# Patient Record
Sex: FEMALE | Race: WHITE | NOT HISPANIC OR LATINO | Employment: OTHER | URBAN - METROPOLITAN AREA
[De-identification: names, ages, dates, MRNs, and addresses within clinical notes are randomized per-mention and may not be internally consistent; named-entity substitution may affect disease eponyms.]

---

## 2020-11-14 PROBLEM — M32.9 LUPUS: Status: ACTIVE | Noted: 2020-11-14

## 2021-04-09 ENCOUNTER — TELEPHONE (OUTPATIENT)
Dept: FAMILY MEDICINE | Facility: CLINIC | Age: 66
End: 2021-04-09

## 2022-06-13 ENCOUNTER — PES CALL (OUTPATIENT)
Dept: ADMINISTRATIVE | Facility: CLINIC | Age: 67
End: 2022-06-13
Payer: MEDICARE

## 2022-07-28 ENCOUNTER — TELEPHONE (OUTPATIENT)
Dept: FAMILY MEDICINE | Facility: CLINIC | Age: 67
End: 2022-07-28
Payer: MEDICARE

## 2022-11-21 ENCOUNTER — OFFICE VISIT (OUTPATIENT)
Dept: OPHTHALMOLOGY | Facility: CLINIC | Age: 67
End: 2022-11-21
Payer: MEDICARE

## 2022-11-21 DIAGNOSIS — Z94.7 H/O CORNEA TRANSPLANT: ICD-10-CM

## 2022-11-21 DIAGNOSIS — Z96.1 PSEUDOPHAKIA, BOTH EYES: ICD-10-CM

## 2022-11-21 DIAGNOSIS — H26.491 POSTERIOR CAPSULAR OPACIFICATION, RIGHT EYE: Primary | ICD-10-CM

## 2022-11-21 DIAGNOSIS — H50.00 ESOTROPIA: ICD-10-CM

## 2022-11-21 PROCEDURE — 3288F FALL RISK ASSESSMENT DOCD: CPT | Mod: CPTII,S$GLB,, | Performed by: OPHTHALMOLOGY

## 2022-11-21 PROCEDURE — 99204 OFFICE O/P NEW MOD 45 MIN: CPT | Mod: S$GLB,,, | Performed by: OPHTHALMOLOGY

## 2022-11-21 PROCEDURE — 1160F PR REVIEW ALL MEDS BY PRESCRIBER/CLIN PHARMACIST DOCUMENTED: ICD-10-PCS | Mod: CPTII,S$GLB,, | Performed by: OPHTHALMOLOGY

## 2022-11-21 PROCEDURE — 1101F PR PT FALLS ASSESS DOC 0-1 FALLS W/OUT INJ PAST YR: ICD-10-PCS | Mod: CPTII,S$GLB,, | Performed by: OPHTHALMOLOGY

## 2022-11-21 PROCEDURE — 1159F PR MEDICATION LIST DOCUMENTED IN MEDICAL RECORD: ICD-10-PCS | Mod: CPTII,S$GLB,, | Performed by: OPHTHALMOLOGY

## 2022-11-21 PROCEDURE — 1101F PT FALLS ASSESS-DOCD LE1/YR: CPT | Mod: CPTII,S$GLB,, | Performed by: OPHTHALMOLOGY

## 2022-11-21 PROCEDURE — 3288F PR FALLS RISK ASSESSMENT DOCUMENTED: ICD-10-PCS | Mod: CPTII,S$GLB,, | Performed by: OPHTHALMOLOGY

## 2022-11-21 PROCEDURE — 99999 PR PBB SHADOW E&M-EST. PATIENT-LVL II: ICD-10-PCS | Mod: PBBFAC,,, | Performed by: OPHTHALMOLOGY

## 2022-11-21 PROCEDURE — 1126F AMNT PAIN NOTED NONE PRSNT: CPT | Mod: CPTII,S$GLB,, | Performed by: OPHTHALMOLOGY

## 2022-11-21 PROCEDURE — 1160F RVW MEDS BY RX/DR IN RCRD: CPT | Mod: CPTII,S$GLB,, | Performed by: OPHTHALMOLOGY

## 2022-11-21 PROCEDURE — 1159F MED LIST DOCD IN RCRD: CPT | Mod: CPTII,S$GLB,, | Performed by: OPHTHALMOLOGY

## 2022-11-21 PROCEDURE — 99999 PR PBB SHADOW E&M-EST. PATIENT-LVL II: CPT | Mod: PBBFAC,,, | Performed by: OPHTHALMOLOGY

## 2022-11-21 PROCEDURE — 1126F PR PAIN SEVERITY QUANTIFIED, NO PAIN PRESENT: ICD-10-PCS | Mod: CPTII,S$GLB,, | Performed by: OPHTHALMOLOGY

## 2022-11-21 PROCEDURE — 99204 PR OFFICE/OUTPT VISIT, NEW, LEVL IV, 45-59 MIN: ICD-10-PCS | Mod: S$GLB,,, | Performed by: OPHTHALMOLOGY

## 2022-11-21 NOTE — PROGRESS NOTES
HPI     Blurred Vision     Additional comments: Here to establish care. Denies eye pain today, pt   c/o double vision states images move in different lighting situations can   occur when driving. States if she covers her left eye images on the right   eye move over they are not straight. Has had two corneal surgeries on   Right eye  . Does not use any eye gtts currently. Pt no longer drives due   to vision.   POH:  K Transplant OD 2003  K Transplant OS 2007  CE IOL OU Done 2007          Last edited by Jenna Be on 11/21/2022  3:28 PM.            Assessment /Plan     For exam results, see Encounter Report.    Posterior capsular opacification, right eye    Pseudophakia, both eyes    H/O cornea transplant    Esotropia    1. Posterior capsular opacification, right eye  Significant, recommend YAG capsultomy RBA discussed, pt agreeable    Schedule yag cap OD    2. Pseudophakia, both eyes  See above, clear OS    3. H/O cornea transplant  PKP OD, DSEK OS  Very high astigmatism  Consider glasses or CL eval after yag cap    4. 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

## 2022-12-20 ENCOUNTER — OFFICE VISIT (OUTPATIENT)
Dept: OPHTHALMOLOGY | Facility: CLINIC | Age: 67
End: 2022-12-20
Payer: MEDICARE

## 2022-12-20 DIAGNOSIS — H26.491 POSTERIOR CAPSULAR OPACIFICATION, RIGHT EYE: Primary | ICD-10-CM

## 2022-12-20 PROCEDURE — 1159F MED LIST DOCD IN RCRD: CPT | Mod: CPTII,S$GLB,, | Performed by: OPHTHALMOLOGY

## 2022-12-20 PROCEDURE — 1159F PR MEDICATION LIST DOCUMENTED IN MEDICAL RECORD: ICD-10-PCS | Mod: CPTII,S$GLB,, | Performed by: OPHTHALMOLOGY

## 2022-12-20 PROCEDURE — 1101F PR PT FALLS ASSESS DOC 0-1 FALLS W/OUT INJ PAST YR: ICD-10-PCS | Mod: CPTII,S$GLB,, | Performed by: OPHTHALMOLOGY

## 2022-12-20 PROCEDURE — 1101F PT FALLS ASSESS-DOCD LE1/YR: CPT | Mod: CPTII,S$GLB,, | Performed by: OPHTHALMOLOGY

## 2022-12-20 PROCEDURE — 99999 PR PBB SHADOW E&M-EST. PATIENT-LVL II: CPT | Mod: PBBFAC,,, | Performed by: OPHTHALMOLOGY

## 2022-12-20 PROCEDURE — 1160F RVW MEDS BY RX/DR IN RCRD: CPT | Mod: CPTII,S$GLB,, | Performed by: OPHTHALMOLOGY

## 2022-12-20 PROCEDURE — 1126F AMNT PAIN NOTED NONE PRSNT: CPT | Mod: CPTII,S$GLB,, | Performed by: OPHTHALMOLOGY

## 2022-12-20 PROCEDURE — 66821 YAG CAPSULOTOMY - OD - RIGHT EYE: ICD-10-PCS | Mod: RT,S$GLB,, | Performed by: OPHTHALMOLOGY

## 2022-12-20 PROCEDURE — 1126F PR PAIN SEVERITY QUANTIFIED, NO PAIN PRESENT: ICD-10-PCS | Mod: CPTII,S$GLB,, | Performed by: OPHTHALMOLOGY

## 2022-12-20 PROCEDURE — 66821 AFTER CATARACT LASER SURGERY: CPT | Mod: RT,S$GLB,, | Performed by: OPHTHALMOLOGY

## 2022-12-20 PROCEDURE — 3288F PR FALLS RISK ASSESSMENT DOCUMENTED: ICD-10-PCS | Mod: CPTII,S$GLB,, | Performed by: OPHTHALMOLOGY

## 2022-12-20 PROCEDURE — 1160F PR REVIEW ALL MEDS BY PRESCRIBER/CLIN PHARMACIST DOCUMENTED: ICD-10-PCS | Mod: CPTII,S$GLB,, | Performed by: OPHTHALMOLOGY

## 2022-12-20 PROCEDURE — 3288F FALL RISK ASSESSMENT DOCD: CPT | Mod: CPTII,S$GLB,, | Performed by: OPHTHALMOLOGY

## 2022-12-20 PROCEDURE — 99499 NO LOS: ICD-10-PCS | Mod: S$GLB,,, | Performed by: OPHTHALMOLOGY

## 2022-12-20 PROCEDURE — 99499 UNLISTED E&M SERVICE: CPT | Mod: S$GLB,,, | Performed by: OPHTHALMOLOGY

## 2022-12-20 PROCEDURE — 99999 PR PBB SHADOW E&M-EST. PATIENT-LVL II: ICD-10-PCS | Mod: PBBFAC,,, | Performed by: OPHTHALMOLOGY

## 2022-12-20 NOTE — PROGRESS NOTES
HPI    YAG cap OD     Pt states vision OD is very cloudy. Can not see much     Denies pain.       +DV   Last edited by Dai Selby on 12/20/2022  3:00 PM.            Assessment /Plan     For exam results, see Encounter Report.    Posterior capsular opacification, right eye      YAG Cap OD today  No complications    Use Ats  F/u with me in 4-8 weeks for DFE OD    Recommend Dr. Marrero for prism eval next available

## 2022-12-21 ENCOUNTER — TELEPHONE (OUTPATIENT)
Dept: OPTOMETRY | Facility: CLINIC | Age: 67
End: 2022-12-21
Payer: MEDICARE

## 2022-12-21 NOTE — TELEPHONE ENCOUNTER
----- Message from Peri Ballard sent at 12/20/2022  4:53 PM CST -----  Madisyn LITTLE Staff  Caller: Unspecified (Today,  3:57 PM)  Please reach out to patient to scheduled Prism/glasses eval, per Dr Farnsworth, next available.         Thanks   Ella

## 2023-02-09 ENCOUNTER — OFFICE VISIT (OUTPATIENT)
Dept: OPTOMETRY | Facility: CLINIC | Age: 68
End: 2023-02-09
Payer: MEDICARE

## 2023-02-09 DIAGNOSIS — H52.7 REFRACTIVE ERROR: ICD-10-CM

## 2023-02-09 DIAGNOSIS — H53.2 DIPLOPIA: Primary | ICD-10-CM

## 2023-02-09 DIAGNOSIS — H50.011 ESOTROPIA, RIGHT EYE: ICD-10-CM

## 2023-02-09 PROCEDURE — 1126F PR PAIN SEVERITY QUANTIFIED, NO PAIN PRESENT: ICD-10-PCS | Mod: CPTII,S$GLB,, | Performed by: OPTOMETRIST

## 2023-02-09 PROCEDURE — 3288F FALL RISK ASSESSMENT DOCD: CPT | Mod: CPTII,S$GLB,, | Performed by: OPTOMETRIST

## 2023-02-09 PROCEDURE — 1101F PR PT FALLS ASSESS DOC 0-1 FALLS W/OUT INJ PAST YR: ICD-10-PCS | Mod: CPTII,S$GLB,, | Performed by: OPTOMETRIST

## 2023-02-09 PROCEDURE — 1160F RVW MEDS BY RX/DR IN RCRD: CPT | Mod: CPTII,S$GLB,, | Performed by: OPTOMETRIST

## 2023-02-09 PROCEDURE — 92002 INTRM OPH EXAM NEW PATIENT: CPT | Mod: S$GLB,,, | Performed by: OPTOMETRIST

## 2023-02-09 PROCEDURE — 1159F MED LIST DOCD IN RCRD: CPT | Mod: CPTII,S$GLB,, | Performed by: OPTOMETRIST

## 2023-02-09 PROCEDURE — 3288F PR FALLS RISK ASSESSMENT DOCUMENTED: ICD-10-PCS | Mod: CPTII,S$GLB,, | Performed by: OPTOMETRIST

## 2023-02-09 PROCEDURE — 99999 PR PBB SHADOW E&M-EST. PATIENT-LVL II: ICD-10-PCS | Mod: PBBFAC,,, | Performed by: OPTOMETRIST

## 2023-02-09 PROCEDURE — 1160F PR REVIEW ALL MEDS BY PRESCRIBER/CLIN PHARMACIST DOCUMENTED: ICD-10-PCS | Mod: CPTII,S$GLB,, | Performed by: OPTOMETRIST

## 2023-02-09 PROCEDURE — 1101F PT FALLS ASSESS-DOCD LE1/YR: CPT | Mod: CPTII,S$GLB,, | Performed by: OPTOMETRIST

## 2023-02-09 PROCEDURE — 1159F PR MEDICATION LIST DOCUMENTED IN MEDICAL RECORD: ICD-10-PCS | Mod: CPTII,S$GLB,, | Performed by: OPTOMETRIST

## 2023-02-09 PROCEDURE — 1126F AMNT PAIN NOTED NONE PRSNT: CPT | Mod: CPTII,S$GLB,, | Performed by: OPTOMETRIST

## 2023-02-09 PROCEDURE — 92002 PR EYE EXAM, NEW PATIENT,INTERMED: ICD-10-PCS | Mod: S$GLB,,, | Performed by: OPTOMETRIST

## 2023-02-09 PROCEDURE — 99999 PR PBB SHADOW E&M-EST. PATIENT-LVL II: CPT | Mod: PBBFAC,,, | Performed by: OPTOMETRIST

## 2023-02-09 NOTE — PROGRESS NOTES
HPI    Pt here for mrx with prism per Dr. Farnsworth dls- 12/20/22    Pt states she has problems with focusing, has diplopia states it seems to   go in and out.   Pt has had prism in the past. Very occasional using eye patch OD.   Last edited by Monserrat Trejo on 2/9/2023  1:18 PM.            Assessment /Plan     For exam results, see Encounter Report.    Diplopia    Esotropia, right eye    Refractive error      1,2. Unable to get single with less than 20 prism diopters, glasses with high cyl and minus would be very thick, recommend eval for surgical correction.   3. Hold off on spec rx for now

## 2023-02-14 ENCOUNTER — OFFICE VISIT (OUTPATIENT)
Dept: OPHTHALMOLOGY | Facility: CLINIC | Age: 68
End: 2023-02-14
Payer: MEDICARE

## 2023-02-14 DIAGNOSIS — Z98.890 S/P YAG CAPSULOTOMY, RIGHT: Primary | ICD-10-CM

## 2023-02-14 DIAGNOSIS — H50.00 ESOTROPIA: ICD-10-CM

## 2023-02-14 PROCEDURE — 1126F PR PAIN SEVERITY QUANTIFIED, NO PAIN PRESENT: ICD-10-PCS | Mod: CPTII,S$GLB,, | Performed by: OPHTHALMOLOGY

## 2023-02-14 PROCEDURE — 99999 PR PBB SHADOW E&M-EST. PATIENT-LVL II: CPT | Mod: PBBFAC,,, | Performed by: OPHTHALMOLOGY

## 2023-02-14 PROCEDURE — 1159F MED LIST DOCD IN RCRD: CPT | Mod: CPTII,S$GLB,, | Performed by: OPHTHALMOLOGY

## 2023-02-14 PROCEDURE — 99999 PR PBB SHADOW E&M-EST. PATIENT-LVL II: ICD-10-PCS | Mod: PBBFAC,,, | Performed by: OPHTHALMOLOGY

## 2023-02-14 PROCEDURE — 99024 PR POST-OP FOLLOW-UP VISIT: ICD-10-PCS | Mod: S$GLB,,, | Performed by: OPHTHALMOLOGY

## 2023-02-14 PROCEDURE — 1126F AMNT PAIN NOTED NONE PRSNT: CPT | Mod: CPTII,S$GLB,, | Performed by: OPHTHALMOLOGY

## 2023-02-14 PROCEDURE — 1159F PR MEDICATION LIST DOCUMENTED IN MEDICAL RECORD: ICD-10-PCS | Mod: CPTII,S$GLB,, | Performed by: OPHTHALMOLOGY

## 2023-02-14 PROCEDURE — 3288F PR FALLS RISK ASSESSMENT DOCUMENTED: ICD-10-PCS | Mod: CPTII,S$GLB,, | Performed by: OPHTHALMOLOGY

## 2023-02-14 PROCEDURE — 1160F PR REVIEW ALL MEDS BY PRESCRIBER/CLIN PHARMACIST DOCUMENTED: ICD-10-PCS | Mod: CPTII,S$GLB,, | Performed by: OPHTHALMOLOGY

## 2023-02-14 PROCEDURE — 3288F FALL RISK ASSESSMENT DOCD: CPT | Mod: CPTII,S$GLB,, | Performed by: OPHTHALMOLOGY

## 2023-02-14 PROCEDURE — 99024 POSTOP FOLLOW-UP VISIT: CPT | Mod: S$GLB,,, | Performed by: OPHTHALMOLOGY

## 2023-02-14 PROCEDURE — 1101F PR PT FALLS ASSESS DOC 0-1 FALLS W/OUT INJ PAST YR: ICD-10-PCS | Mod: CPTII,S$GLB,, | Performed by: OPHTHALMOLOGY

## 2023-02-14 PROCEDURE — 1160F RVW MEDS BY RX/DR IN RCRD: CPT | Mod: CPTII,S$GLB,, | Performed by: OPHTHALMOLOGY

## 2023-02-14 PROCEDURE — 1101F PT FALLS ASSESS-DOCD LE1/YR: CPT | Mod: CPTII,S$GLB,, | Performed by: OPHTHALMOLOGY

## 2023-02-14 NOTE — PROGRESS NOTES
HPI    Pt presents for 6 week DFE OD     Pt states she can see more peripherally OD now.     Systane PRN OU          Last edited by Madisyn Uribe on 2/14/2023 10:22 AM.            Assessment /Plan     For exam results, see Encounter Report.    S/P YAG capsulotomy, right    Esotropia      1. S/P YAG capsulotomy, right  Successful outcome on exam  [facility] the same, but pt reports improved peripheral vision    2. 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 Marrero could not get prisms to work and recommended strab jabari  Will refer to strab sx

## 2023-02-15 ENCOUNTER — TELEPHONE (OUTPATIENT)
Dept: OPHTHALMOLOGY | Facility: CLINIC | Age: 68
End: 2023-02-15
Payer: MEDICARE

## 2023-02-15 NOTE — TELEPHONE ENCOUNTER
Lvm for patient to get scheduled.    -   ----- Message from Hermila Mitchell sent at 2/14/2023  4:48 PM CST -----    ----- Message -----  From: Madisyn Uribe  Sent: 2/14/2023  11:19 AM CST  To: Duran SANTA Staff    Good morning,     Can you please reach out to the patient for a strab eval with Dr Garzon after approval?    Thanks,  Ella        Valtrex Pregnancy And Lactation Text: this medication is Pregnancy Category B and is considered safe during pregnancy. This medication is not directly found in breast milk but it's metabolite acyclovir is present.

## 2023-04-26 ENCOUNTER — OFFICE VISIT (OUTPATIENT)
Dept: OPHTHALMOLOGY | Facility: CLINIC | Age: 68
End: 2023-04-26
Payer: MEDICARE

## 2023-04-26 DIAGNOSIS — R11.0 NAUSEA: ICD-10-CM

## 2023-04-26 DIAGNOSIS — H53.2 DIPLOPIA: ICD-10-CM

## 2023-04-26 DIAGNOSIS — H50.00 ESOTROPIA: Primary | ICD-10-CM

## 2023-04-26 PROCEDURE — 99213 OFFICE O/P EST LOW 20 MIN: CPT | Mod: S$GLB,,, | Performed by: STUDENT IN AN ORGANIZED HEALTH CARE EDUCATION/TRAINING PROGRAM

## 2023-04-26 PROCEDURE — 92060 SENSORIMOTOR EXAMINATION: CPT | Mod: S$GLB,,, | Performed by: STUDENT IN AN ORGANIZED HEALTH CARE EDUCATION/TRAINING PROGRAM

## 2023-04-26 PROCEDURE — 1159F PR MEDICATION LIST DOCUMENTED IN MEDICAL RECORD: ICD-10-PCS | Mod: CPTII,S$GLB,, | Performed by: STUDENT IN AN ORGANIZED HEALTH CARE EDUCATION/TRAINING PROGRAM

## 2023-04-26 PROCEDURE — 99213 PR OFFICE/OUTPT VISIT, EST, LEVL III, 20-29 MIN: ICD-10-PCS | Mod: S$GLB,,, | Performed by: STUDENT IN AN ORGANIZED HEALTH CARE EDUCATION/TRAINING PROGRAM

## 2023-04-26 PROCEDURE — 99999 PR PBB SHADOW E&M-EST. PATIENT-LVL I: CPT | Mod: PBBFAC,,, | Performed by: STUDENT IN AN ORGANIZED HEALTH CARE EDUCATION/TRAINING PROGRAM

## 2023-04-26 PROCEDURE — 99999 PR PBB SHADOW E&M-EST. PATIENT-LVL I: ICD-10-PCS | Mod: PBBFAC,,, | Performed by: STUDENT IN AN ORGANIZED HEALTH CARE EDUCATION/TRAINING PROGRAM

## 2023-04-26 PROCEDURE — 1159F MED LIST DOCD IN RCRD: CPT | Mod: CPTII,S$GLB,, | Performed by: STUDENT IN AN ORGANIZED HEALTH CARE EDUCATION/TRAINING PROGRAM

## 2023-04-26 PROCEDURE — 92060 PR SPECIAL EYE EVAL,SENSORIMOTOR: ICD-10-PCS | Mod: S$GLB,,, | Performed by: STUDENT IN AN ORGANIZED HEALTH CARE EDUCATION/TRAINING PROGRAM

## 2023-04-26 NOTE — PROGRESS NOTES
Assessment /Plan     For exam results, see Encounter Report.    Esotropia    Diplopia    Nausea      Discussed findings with patient today.    Deviation measures 20-25 however she fuses with much less   Mainly commitant deviation present for several years.   Discussed option of prism glasses, vs patching vs surgery   She would like to try prism glasses as is able to fuse with only 10PD today   Fresnel rx given. Discussed trying for a couple fo weeks and calling if working for ground in prism   If break through bothersome double can consider strab surgery.     RTC pending trial with fresnel prism     This service was scribed by Hermila Mitchell for and in the presence of Dr. Garzon who personally performed this service.    Hermila Mitchell, technician     Valerie Garzon MD

## 2023-05-11 ENCOUNTER — TELEPHONE (OUTPATIENT)
Dept: OPHTHALMOLOGY | Facility: CLINIC | Age: 68
End: 2023-05-11
Payer: MEDICARE

## 2023-05-11 NOTE — TELEPHONE ENCOUNTER
Dariel for pt to call.    -   ----- Message from Martín Deluca sent at 5/11/2023 12:04 PM CDT -----  Contact: [phone]  Pt is calling and was told to speak with someone about how she has been feeling after two weeks. . Please call back to further assist

## 2023-06-07 ENCOUNTER — TELEPHONE (OUTPATIENT)
Dept: OPHTHALMOLOGY | Facility: CLINIC | Age: 68
End: 2023-06-07
Payer: MEDICARE

## 2023-06-07 NOTE — TELEPHONE ENCOUNTER
[facility] for pt to call back         ----- Message from Khurram Cavazos sent at 6/7/2023 11:29 AM CDT -----  Contact: pt at  [phone]  Type: Needs Medical Advice  Who Called:  pt  Best Call Back Number: [phone]  Additional Information: pt is calling the office requesting a call back from the nurse.

## 2023-06-08 ENCOUNTER — TELEPHONE (OUTPATIENT)
Dept: OPHTHALMOLOGY | Facility: CLINIC | Age: 68
End: 2023-06-08
Payer: MEDICARE

## 2023-06-08 NOTE — TELEPHONE ENCOUNTER
Narinder for pt to call back     ----- Message from Carolyn Willoughby sent at 6/8/2023  9:20 AM CDT -----  Type:  Patient Returning Call    Who Called:  pt   Who Left Message for Patient:  christian    Does the patient know what this is regarding?:  yes   Best Call Back Number:  [phone]    Additional Information:  please advise

## 2023-06-09 ENCOUNTER — LAB VISIT (OUTPATIENT)
Dept: LAB | Facility: HOSPITAL | Age: 68
End: 2023-06-09
Attending: STUDENT IN AN ORGANIZED HEALTH CARE EDUCATION/TRAINING PROGRAM
Payer: MEDICARE

## 2023-06-09 ENCOUNTER — OFFICE VISIT (OUTPATIENT)
Dept: OPHTHALMOLOGY | Facility: CLINIC | Age: 68
End: 2023-06-09
Payer: MEDICARE

## 2023-06-09 DIAGNOSIS — H53.2 DIPLOPIA: ICD-10-CM

## 2023-06-09 DIAGNOSIS — H53.30 DISORDER OF BINOCULAR VISION: ICD-10-CM

## 2023-06-09 DIAGNOSIS — H50.00 ESOTROPIA: Primary | ICD-10-CM

## 2023-06-09 DIAGNOSIS — H51.9 ABNORMAL EYE MOVEMENTS: ICD-10-CM

## 2023-06-09 LAB
CREAT SERPL-MCNC: 0.8 MG/DL (ref 0.5–1.4)
EST. GFR  (NO RACE VARIABLE): >60 ML/MIN/1.73 M^2

## 2023-06-09 PROCEDURE — 99214 OFFICE O/P EST MOD 30 MIN: CPT | Mod: S$GLB,,, | Performed by: STUDENT IN AN ORGANIZED HEALTH CARE EDUCATION/TRAINING PROGRAM

## 2023-06-09 PROCEDURE — 1101F PR PT FALLS ASSESS DOC 0-1 FALLS W/OUT INJ PAST YR: ICD-10-PCS | Mod: CPTII,S$GLB,, | Performed by: STUDENT IN AN ORGANIZED HEALTH CARE EDUCATION/TRAINING PROGRAM

## 2023-06-09 PROCEDURE — 1126F PR PAIN SEVERITY QUANTIFIED, NO PAIN PRESENT: ICD-10-PCS | Mod: CPTII,S$GLB,, | Performed by: STUDENT IN AN ORGANIZED HEALTH CARE EDUCATION/TRAINING PROGRAM

## 2023-06-09 PROCEDURE — 92060 PR SPECIAL EYE EVAL,SENSORIMOTOR: ICD-10-PCS | Mod: S$GLB,,, | Performed by: STUDENT IN AN ORGANIZED HEALTH CARE EDUCATION/TRAINING PROGRAM

## 2023-06-09 PROCEDURE — 99214 PR OFFICE/OUTPT VISIT, EST, LEVL IV, 30-39 MIN: ICD-10-PCS | Mod: S$GLB,,, | Performed by: STUDENT IN AN ORGANIZED HEALTH CARE EDUCATION/TRAINING PROGRAM

## 2023-06-09 PROCEDURE — 36415 COLL VENOUS BLD VENIPUNCTURE: CPT | Performed by: STUDENT IN AN ORGANIZED HEALTH CARE EDUCATION/TRAINING PROGRAM

## 2023-06-09 PROCEDURE — 82565 ASSAY OF CREATININE: CPT | Performed by: STUDENT IN AN ORGANIZED HEALTH CARE EDUCATION/TRAINING PROGRAM

## 2023-06-09 PROCEDURE — 1126F AMNT PAIN NOTED NONE PRSNT: CPT | Mod: CPTII,S$GLB,, | Performed by: STUDENT IN AN ORGANIZED HEALTH CARE EDUCATION/TRAINING PROGRAM

## 2023-06-09 PROCEDURE — 92060 SENSORIMOTOR EXAMINATION: CPT | Mod: S$GLB,,, | Performed by: STUDENT IN AN ORGANIZED HEALTH CARE EDUCATION/TRAINING PROGRAM

## 2023-06-09 PROCEDURE — 3288F PR FALLS RISK ASSESSMENT DOCUMENTED: ICD-10-PCS | Mod: CPTII,S$GLB,, | Performed by: STUDENT IN AN ORGANIZED HEALTH CARE EDUCATION/TRAINING PROGRAM

## 2023-06-09 PROCEDURE — 3288F FALL RISK ASSESSMENT DOCD: CPT | Mod: CPTII,S$GLB,, | Performed by: STUDENT IN AN ORGANIZED HEALTH CARE EDUCATION/TRAINING PROGRAM

## 2023-06-09 PROCEDURE — 99999 PR PBB SHADOW E&M-EST. PATIENT-LVL II: CPT | Mod: PBBFAC,,, | Performed by: STUDENT IN AN ORGANIZED HEALTH CARE EDUCATION/TRAINING PROGRAM

## 2023-06-09 PROCEDURE — 99999 PR PBB SHADOW E&M-EST. PATIENT-LVL II: ICD-10-PCS | Mod: PBBFAC,,, | Performed by: STUDENT IN AN ORGANIZED HEALTH CARE EDUCATION/TRAINING PROGRAM

## 2023-06-09 PROCEDURE — 1159F PR MEDICATION LIST DOCUMENTED IN MEDICAL RECORD: ICD-10-PCS | Mod: CPTII,S$GLB,, | Performed by: STUDENT IN AN ORGANIZED HEALTH CARE EDUCATION/TRAINING PROGRAM

## 2023-06-09 PROCEDURE — 1101F PT FALLS ASSESS-DOCD LE1/YR: CPT | Mod: CPTII,S$GLB,, | Performed by: STUDENT IN AN ORGANIZED HEALTH CARE EDUCATION/TRAINING PROGRAM

## 2023-06-09 PROCEDURE — 1159F MED LIST DOCD IN RCRD: CPT | Mod: CPTII,S$GLB,, | Performed by: STUDENT IN AN ORGANIZED HEALTH CARE EDUCATION/TRAINING PROGRAM

## 2023-06-09 NOTE — PROGRESS NOTES
HPI    Prism check    Pt c/o of having issues with her glasses. She believes the glasses she   used may not be hers to begin with. Pt also states she feels like they may   have been on the wrong eye? Pt having nausea and dizziness when wearing   glasses, especially with movement. Has not wore glasses for about 2 weeks   now.   Last edited by Valerie Garzon MD on 6/9/2023  1:32 PM.        ROS    Positive for: Eyes  Negative for: Constitutional  Last edited by Valerie Garzon MD on 6/9/2023 11:14 AM.        Assessment /Plan     For exam results, see Encounter Report.    Esotropia    Diplopia  -     MRI Brain W WO Contrast; Future; Expected date: 06/09/2023  -     Creatinine, serum; Future; Expected date: 06/09/2023    Abnormal eye movements    Disorder of binocular vision      Discussed findings with Ms. Long today   She notes the glasses she was wearing the Rx was not hers. 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     RTC pending glasses trial       Time spent on this encounter: 35 minutes. This includes face to face time and non-face to face time preparing to see the patient (eg, review of tests), obtaining and/or reviewing separately obtained history, documenting clinical information in the electronic or other health record, independently interpreting results and communicating results to the patient/family/caregiver, or care coordinator.

## 2023-07-26 DIAGNOSIS — Z12.31 ENCOUNTER FOR SCREENING MAMMOGRAM FOR MALIGNANT NEOPLASM OF BREAST: Primary | ICD-10-CM

## 2023-07-26 DIAGNOSIS — Z78.0 MENOPAUSE: ICD-10-CM

## 2023-08-10 ENCOUNTER — HOSPITAL ENCOUNTER (OUTPATIENT)
Dept: RADIOLOGY | Facility: HOSPITAL | Age: 68
Discharge: HOME | End: 2023-08-10
Attending: INTERNAL MEDICINE
Payer: MEDICARE

## 2023-08-10 VITALS — HEIGHT: 64 IN | BODY MASS INDEX: 27.52 KG/M2 | WEIGHT: 161.19 LBS

## 2023-08-10 DIAGNOSIS — Z78.0 MENOPAUSE: ICD-10-CM

## 2023-08-10 DIAGNOSIS — Z12.31 ENCOUNTER FOR SCREENING MAMMOGRAM FOR MALIGNANT NEOPLASM OF BREAST: ICD-10-CM

## 2023-08-10 PROCEDURE — 77080 DXA BONE DENSITY AXIAL: CPT | Mod: TC,PO

## 2023-08-10 PROCEDURE — 77067 SCR MAMMO BI INCL CAD: CPT | Mod: TC,PO

## 2023-08-16 DIAGNOSIS — R92.8 ABNORMAL MAMMOGRAM: Primary | ICD-10-CM

## 2023-09-27 ENCOUNTER — HOSPITAL ENCOUNTER (OUTPATIENT)
Dept: RADIOLOGY | Facility: HOSPITAL | Age: 68
Discharge: HOME | End: 2023-09-27
Attending: INTERNAL MEDICINE
Payer: MEDICARE

## 2023-09-27 DIAGNOSIS — R92.8 ABNORMAL MAMMOGRAM: ICD-10-CM

## 2023-09-27 PROCEDURE — 76642 ULTRASOUND BREAST LIMITED: CPT | Mod: TC,PO,LT

## 2023-12-05 ENCOUNTER — TELEPHONE (OUTPATIENT)
Dept: OPTOMETRY | Facility: CLINIC | Age: 68
End: 2023-12-05
Payer: MEDICARE

## 2023-12-05 NOTE — TELEPHONE ENCOUNTER
----- Message from Elisa Richard sent at 12/5/2023  1:08 PM CST -----  Type:  Needs Medical Advice    Who Called:  Pt    Would the patient rather a call back or a response via [facility]?  Call back.phon    Best Call Back Number:  [phone]    Additional Information:  Pt is trying to find out who Dr Farnsworth referred her to because she can't remember the name. Wanting to know if she will need another referral for that dr to get prism in her left eye checked again. Can't see clearly out her left eye just a blur.  Please call back to advise. Thanks!

## 2024-04-24 ENCOUNTER — OFFICE VISIT (OUTPATIENT)
Dept: OPTOMETRY | Facility: CLINIC | Age: 69
End: 2024-04-24
Payer: MEDICARE

## 2024-04-24 DIAGNOSIS — H18.20 CORNEAL EDEMA OF LEFT EYE: Primary | ICD-10-CM

## 2024-04-24 DIAGNOSIS — H53.2 DIPLOPIA: ICD-10-CM

## 2024-04-24 DIAGNOSIS — Z94.7 H/O CORNEA TRANSPLANT: ICD-10-CM

## 2024-04-24 PROCEDURE — 99999 PR PBB SHADOW E&M-EST. PATIENT-LVL III: CPT | Mod: PBBFAC,,, | Performed by: OPTOMETRIST

## 2024-04-24 PROCEDURE — 1101F PT FALLS ASSESS-DOCD LE1/YR: CPT | Mod: CPTII,S$GLB,, | Performed by: OPTOMETRIST

## 2024-04-24 PROCEDURE — 1159F MED LIST DOCD IN RCRD: CPT | Mod: CPTII,S$GLB,, | Performed by: OPTOMETRIST

## 2024-04-24 PROCEDURE — 92014 COMPRE OPH EXAM EST PT 1/>: CPT | Mod: S$GLB,,, | Performed by: OPTOMETRIST

## 2024-04-24 PROCEDURE — 3288F FALL RISK ASSESSMENT DOCD: CPT | Mod: CPTII,S$GLB,, | Performed by: OPTOMETRIST

## 2024-04-24 PROCEDURE — 1160F RVW MEDS BY RX/DR IN RCRD: CPT | Mod: CPTII,S$GLB,, | Performed by: OPTOMETRIST

## 2024-04-24 PROCEDURE — 1126F AMNT PAIN NOTED NONE PRSNT: CPT | Mod: CPTII,S$GLB,, | Performed by: OPTOMETRIST

## 2024-04-24 NOTE — PATIENT INSTRUCTIONS
Edema of cornea graft left eye causing decreased vision and depth perception, recommend eval with cornea surgeon for treatment

## 2024-04-24 NOTE — PROGRESS NOTES
HPI     Eye Problem     Additional comments: Blur os at dist, x mos, no assoc pain or red, no   relief over time, constant           Comments    [id number]/23    Pt complains of blurred dva with last glasses rx OS>OD. Denies diplopia   with glasses. States she liked glasses as first, but feels va has changed.   Seeing star bursts OS. Denies any flashes or floaters.           Last edited by Marlon Marrero, OD on 4/24/2024  3:47 PM.            Assessment /Plan     For exam results, see Encounter Report.    Corneal edema of left eye    H/O cornea transplant    Diplopia      1,2. Refer to cornea for eval, ok to try preserv free tears 4x/day until appt.  3. Resolved with prism

## 2024-04-25 ENCOUNTER — TELEPHONE (OUTPATIENT)
Dept: OPHTHALMOLOGY | Facility: CLINIC | Age: 69
End: 2024-04-25
Payer: MEDICARE

## 2024-04-25 NOTE — TELEPHONE ENCOUNTER
Called to reschedule pt per patient request. Left message asking to call back.    ----- Message from Carito Ormary sent at 4/25/2024  3:31 PM CDT -----  Contact: Pt [phone]  Would like to receive medical advice.    Would they like a call back or a response via [facility]:  call back     Additional information:  Pt is calling to reschedule appt.

## 2024-06-11 ENCOUNTER — OFFICE VISIT (OUTPATIENT)
Dept: OPHTHALMOLOGY | Facility: CLINIC | Age: 69
End: 2024-06-11
Payer: MEDICARE

## 2024-06-11 DIAGNOSIS — T86.8402 CORNEAL TRANSPLANT REJECTION, LEFT EYE: Primary | ICD-10-CM

## 2024-06-11 PROCEDURE — 1100F PTFALLS ASSESS-DOCD GE2>/YR: CPT | Mod: CPTII,S$GLB,, | Performed by: OPHTHALMOLOGY

## 2024-06-11 PROCEDURE — 99214 OFFICE O/P EST MOD 30 MIN: CPT | Mod: S$GLB,,, | Performed by: OPHTHALMOLOGY

## 2024-06-11 PROCEDURE — 1126F AMNT PAIN NOTED NONE PRSNT: CPT | Mod: CPTII,S$GLB,, | Performed by: OPHTHALMOLOGY

## 2024-06-11 PROCEDURE — 3288F FALL RISK ASSESSMENT DOCD: CPT | Mod: CPTII,S$GLB,, | Performed by: OPHTHALMOLOGY

## 2024-06-11 PROCEDURE — G2211 COMPLEX E/M VISIT ADD ON: HCPCS | Mod: S$GLB,,, | Performed by: OPHTHALMOLOGY

## 2024-06-11 PROCEDURE — 1159F MED LIST DOCD IN RCRD: CPT | Mod: CPTII,S$GLB,, | Performed by: OPHTHALMOLOGY

## 2024-06-11 PROCEDURE — 99999 PR PBB SHADOW E&M-EST. PATIENT-LVL II: CPT | Mod: PBBFAC,,, | Performed by: OPHTHALMOLOGY

## 2024-06-11 RX ORDER — DIFLUPREDNATE OPHTHALMIC 0.5 MG/ML
1 EMULSION OPHTHALMIC 4 TIMES DAILY
Qty: 5 ML | Refills: 3 | Status: SHIPPED | OUTPATIENT
Start: 2024-06-11 | End: 2024-06-21

## 2024-06-11 RX ORDER — HYDROCODONE BITARTRATE AND ACETAMINOPHEN 5; 325 MG/1; MG/1
TABLET ORAL
COMMUNITY
End: 2024-06-11

## 2024-06-11 NOTE — PROGRESS NOTES
"HPI    Dr. Marrero    K edema OS  S/p PKP OD 2003 - [place]  S/p DSEK OS 2007 - "  S/p phaco OU 2007  Esotropia    Patient here for cornea evaluation. Patient states Dr. Marrero told her   she had some swelling on her left cornea and wanted Dr. Hernandez to take a   look. Hasn't been on drops for several years-never was told to use drops   long term after either cornea transplant.     Last edited by Soo Hernandez MD on 6/14/2024  4:02 PM.            Assessment /Plan     For exam results, see Encounter Report.    Corneal transplant rejection, left eye    Other orders  -     difluprednate (DUREZOL) 0.05 % Drop ophthalmic solution; Place 1 drop into the left eye 4 (four) times daily. for 10 days  Dispense: 5 mL; Refill: 3      Will attempt to rescue DSEK with aggressive steroids, STK may also be indicated if NI with drops.    F/up 1 wk - va/IOP OS               LEFT EYE:    PRED FORTE - SHAKE WELL - 1 DROP LEFT EYE EVERY 1-2 HOURS...    UNTIL YOU GET YOUR NEW DROPS -- THEN STOP THE PRED FORTE AND START:    DUREZOL 1 DROP 4 TIMES A DAY     Today's visit is associated with current and anticipated ongoing medical care related to this patient's single serious/complex condition (cornea). Follow up is to be continued indefinitely to monitor the condition.       "

## 2024-06-11 NOTE — PATIENT INSTRUCTIONS
LEFT EYE:    PRED FORTE - SHAKE WELL - 1 DROP LEFT EYE EVERY 1-2 HOURS...    UNTIL YOU GET YOUR NEW DROPS -- THEN STOP THE PRED FORTE AND START:    DUREZOL 1 DROP 4 TIMES A DAY

## 2024-06-20 ENCOUNTER — TELEPHONE (OUTPATIENT)
Dept: OPHTHALMOLOGY | Facility: CLINIC | Age: 69
End: 2024-06-20
Payer: MEDICARE

## 2024-06-20 NOTE — TELEPHONE ENCOUNTER
----- Message from Soo Hernandez MD sent at 6/20/2024  1:33 PM CDT -----  Regarding: RE: Durezol not covered- Pred, Loteprednol, Lotemax, or FML are covered.  Pred forte q2hrs  ----- Message -----  From: Perla Villela MA  Sent: 6/20/2024  10:54 AM CDT  To: Soo Hernandez MD  Subject: Durezol not covered- Pred, Loteprednol, Lote#

## 2024-07-24 ENCOUNTER — OFFICE VISIT (OUTPATIENT)
Dept: OPTOMETRY | Facility: CLINIC | Age: 69
End: 2024-07-24
Payer: MEDICARE

## 2024-07-24 DIAGNOSIS — H18.20 CORNEAL EDEMA OF LEFT EYE: Primary | ICD-10-CM

## 2024-07-24 DIAGNOSIS — Z94.7 H/O CORNEA TRANSPLANT: ICD-10-CM

## 2024-07-24 PROCEDURE — 99213 OFFICE O/P EST LOW 20 MIN: CPT | Mod: S$GLB,,, | Performed by: OPTOMETRIST

## 2024-07-24 PROCEDURE — 1160F RVW MEDS BY RX/DR IN RCRD: CPT | Mod: CPTII,S$GLB,, | Performed by: OPTOMETRIST

## 2024-07-24 PROCEDURE — 3288F FALL RISK ASSESSMENT DOCD: CPT | Mod: CPTII,S$GLB,, | Performed by: OPTOMETRIST

## 2024-07-24 PROCEDURE — 1126F AMNT PAIN NOTED NONE PRSNT: CPT | Mod: CPTII,S$GLB,, | Performed by: OPTOMETRIST

## 2024-07-24 PROCEDURE — 99999 PR PBB SHADOW E&M-EST. PATIENT-LVL II: CPT | Mod: PBBFAC,,, | Performed by: OPTOMETRIST

## 2024-07-24 PROCEDURE — 1100F PTFALLS ASSESS-DOCD GE2>/YR: CPT | Mod: CPTII,S$GLB,, | Performed by: OPTOMETRIST

## 2024-07-24 PROCEDURE — 1159F MED LIST DOCD IN RCRD: CPT | Mod: CPTII,S$GLB,, | Performed by: OPTOMETRIST

## 2024-07-24 RX ORDER — PREDNISOLONE ACETATE 10 MG/ML
1 SUSPENSION/ DROPS OPHTHALMIC
Qty: 5 ML | Refills: 1 | Status: SHIPPED | OUTPATIENT
Start: 2024-07-24 | End: 2024-08-07

## 2024-07-24 NOTE — PROGRESS NOTES
"HPI    Dr. Marrero     K edema OS   S/p PKP OD 2003 - [place]   S/p DSEK OS 2007 - "   S/p phaco OU 2007   Esotropia     Pt complains of OS deteriorating. Feels she's seeing star busts and foggy   va. States she's been using preservative free AT possibly PF but states rx   was too expensive. Never got second drop that was less expensive    Last edited by Marlon Marrero, OD on 7/24/2024  1:28 PM.            Assessment /Plan     For exam results, see Encounter Report.    Corneal edema of left eye  -     prednisoLONE acetate (PRED FORTE) 1 % DrpS; Place 1 drop into the left eye every 2 (two) hours. for 14 days  Dispense: 5 mL; Refill: 1    H/O cornea transplant  -     prednisoLONE acetate (PRED FORTE) 1 % DrpS; Place 1 drop into the left eye every 2 (two) hours. for 14 days  Dispense: 5 mL; Refill: 1      1,2. Pt never received rx eyedrop, it was too expensive and did not get second drop, start Pred forte q2h os as recommended by KORY Hernandez 2 weeks follow up, if no better with vision, will need Mary appt for surgical eval                   "

## 2024-08-12 ENCOUNTER — OFFICE VISIT (OUTPATIENT)
Dept: OPTOMETRY | Facility: CLINIC | Age: 69
End: 2024-08-12
Payer: MEDICARE

## 2024-08-12 ENCOUNTER — TELEPHONE (OUTPATIENT)
Dept: OPHTHALMOLOGY | Facility: CLINIC | Age: 69
End: 2024-08-12
Payer: MEDICARE

## 2024-08-12 DIAGNOSIS — Z94.7 H/O CORNEA TRANSPLANT: ICD-10-CM

## 2024-08-12 DIAGNOSIS — H18.20 CORNEAL EDEMA OF LEFT EYE: Primary | ICD-10-CM

## 2024-08-12 PROCEDURE — 1159F MED LIST DOCD IN RCRD: CPT | Mod: CPTII,S$GLB,, | Performed by: OPTOMETRIST

## 2024-08-12 PROCEDURE — 99213 OFFICE O/P EST LOW 20 MIN: CPT | Mod: S$GLB,,, | Performed by: OPTOMETRIST

## 2024-08-12 PROCEDURE — 99999 PR PBB SHADOW E&M-EST. PATIENT-LVL II: CPT | Mod: PBBFAC,,, | Performed by: OPTOMETRIST

## 2024-08-12 PROCEDURE — 1100F PTFALLS ASSESS-DOCD GE2>/YR: CPT | Mod: CPTII,S$GLB,, | Performed by: OPTOMETRIST

## 2024-08-12 PROCEDURE — 1126F AMNT PAIN NOTED NONE PRSNT: CPT | Mod: CPTII,S$GLB,, | Performed by: OPTOMETRIST

## 2024-08-12 PROCEDURE — 1160F RVW MEDS BY RX/DR IN RCRD: CPT | Mod: CPTII,S$GLB,, | Performed by: OPTOMETRIST

## 2024-08-12 PROCEDURE — 3288F FALL RISK ASSESSMENT DOCD: CPT | Mod: CPTII,S$GLB,, | Performed by: OPTOMETRIST

## 2024-08-12 RX ORDER — GABAPENTIN 300 MG/1
300 CAPSULE ORAL
COMMUNITY
Start: 2024-08-08

## 2024-08-12 NOTE — TELEPHONE ENCOUNTER
Left message requesting patient call back to schedule follow up with Dr. Hernandez for DSEK OS rejection per Dr. Marrero.

## 2024-08-12 NOTE — TELEPHONE ENCOUNTER
----- Message from Robbin Szymanski MA sent at 8/12/2024  2:00 PM CDT -----  Please advise when possible. Thanks  ----- Message -----  From: Marlon Marrero OD  Sent: 8/12/2024   1:55 PM CDT  To: Robbin Szymanski MA    Pt needs Mary appt for left eye cornea transplant      Thank you

## 2024-08-12 NOTE — PROGRESS NOTES
"HPI    Pt. Here for F/U for K Edema of left eye. DLE - 07/24/25    Pt. States OS still giving her trouble due to blurriness. Still feels like   VA is deteriorating. VA goes in and out of focus Pt. Never began taking   Pred forte due to being allergic to what was ordered. Currently using   systane BID. Sees "starburts" and floaters.   Last edited by Robbin Szymanski MA on 8/12/2024  1:23 PM.            Assessment /Plan     For exam results, see Encounter Report.    Corneal edema of left eye    H/O cornea transplant      1,2. Pt unable to get steroid eye drops due to being allergic to one ingredient, she will get name and send to us to put in her chart. RTC with Mary for eval os. Ok to cont with OTC systane drops                   "

## 2025-02-25 ENCOUNTER — OFFICE VISIT (OUTPATIENT)
Dept: OPHTHALMOLOGY | Facility: CLINIC | Age: 70
End: 2025-02-25
Payer: MEDICARE

## 2025-02-25 DIAGNOSIS — H50.00 ESOTROPIA: ICD-10-CM

## 2025-02-25 DIAGNOSIS — T86.8402 CORNEAL TRANSPLANT REJECTION, LEFT EYE: ICD-10-CM

## 2025-02-25 DIAGNOSIS — H53.2 DIPLOPIA: ICD-10-CM

## 2025-02-25 DIAGNOSIS — H51.9 ABNORMAL EYE MOVEMENTS: ICD-10-CM

## 2025-02-25 DIAGNOSIS — T86.8412 FAILURE OF CORNEA TRANSPLANT OF LEFT EYE: Primary | ICD-10-CM

## 2025-02-25 PROCEDURE — 1159F MED LIST DOCD IN RCRD: CPT | Mod: CPTII,S$GLB,, | Performed by: OPHTHALMOLOGY

## 2025-02-25 PROCEDURE — 1101F PT FALLS ASSESS-DOCD LE1/YR: CPT | Mod: CPTII,S$GLB,, | Performed by: OPHTHALMOLOGY

## 2025-02-25 PROCEDURE — 99999 PR PBB SHADOW E&M-EST. PATIENT-LVL III: CPT | Mod: PBBFAC,,, | Performed by: OPHTHALMOLOGY

## 2025-02-25 PROCEDURE — 3288F FALL RISK ASSESSMENT DOCD: CPT | Mod: CPTII,S$GLB,, | Performed by: OPHTHALMOLOGY

## 2025-02-25 PROCEDURE — 99213 OFFICE O/P EST LOW 20 MIN: CPT | Mod: S$GLB,,, | Performed by: OPHTHALMOLOGY

## 2025-02-25 PROCEDURE — 1125F AMNT PAIN NOTED PAIN PRSNT: CPT | Mod: CPTII,S$GLB,, | Performed by: OPHTHALMOLOGY

## 2025-02-25 PROCEDURE — G2211 COMPLEX E/M VISIT ADD ON: HCPCS | Mod: S$GLB,,, | Performed by: OPHTHALMOLOGY

## 2025-02-25 RX ORDER — DIFLUPREDNATE OPHTHALMIC 0.5 MG/ML
1 EMULSION OPHTHALMIC 4 TIMES DAILY
Qty: 5 ML | Refills: 6 | Status: SHIPPED | OUTPATIENT
Start: 2025-02-25 | End: 2026-02-25

## 2025-02-25 NOTE — PROGRESS NOTES
"HPI     Blurred Vision     Additional comments: Patient states that her OS vision has gotten a lot   worse and having more flashes and "starbursts" more often           Comments    Dr. Elida ASHRAF edema OS   S/p PKP OD 2003 - [place]   S/p DSEK OS 2007 - "   S/p phaco OU 2007   Esotropia     Did not use PF nor did she fill durezol        Pt. States OS still giving her trouble due to blurriness. Still feels like   VA is deteriorating. VA goes in and out of focus Pt. Never began taking   Pred forte due to being allergic to what was or dered. Currently using   systane BID. Sees "starburts" and floaters.             Last edited by Soo Hernandez MD on 2/25/2025 12:44 PM.            Assessment /Plan     For exam results, see Encounter Report.    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  Dispense: 5 mL; Refill: 6                    K edema OS   S/p PKP OD 2003 - [place]   S/p DSEK OS 2007 - "   S/p phaco OU 2007   Esotropia     Rejected vs. Failed DSEK OS    Instructed pt to use PF then durezol at last visit 6/24 -- pt did not comply. Lost to f/up.    Maybe due to expenses vs. Pharmacist telling her she had an allergy?    Also need to prove that pt can be compliant with drops if she needs another sx.    Will attempt to rescue DSEK with aggressive steroids    If NI - discuss repeat DSEK OS.    F/up 4 wks - va/IOP OS               LEFT EYE:    PRED FORTE - SHAKE WELL - 1 DROP LEFT EYE EVERY 1-2 HOURS...    UNTIL YOU GET YOUR NEW DROPS -- THEN STOP THE PRED FORTE AND START:    DUREZOL 1 DROP 4 TIMES A DAY     Today's visit is associated with current and anticipated ongoing medical care related to this patient's single serious/complex condition (cornea). Follow up is to be continued " indefinitely to monitor the condition.

## 2025-02-25 NOTE — PATIENT INSTRUCTIONS
PRED FORTE - SHAKE WELL - every 2 hours until you receive durezol - use durezol 4 times a day left eye

## 2025-04-14 ENCOUNTER — TELEPHONE (OUTPATIENT)
Dept: OPHTHALMOLOGY | Facility: CLINIC | Age: 70
End: 2025-04-14
Payer: MEDICARE

## 2025-04-14 DIAGNOSIS — T86.8412 FAILURE OF CORNEA TRANSPLANT OF LEFT EYE: Primary | ICD-10-CM

## 2025-04-28 DIAGNOSIS — T86.8412 FAILURE OF CORNEA TRANSPLANT OF LEFT EYE: Primary | ICD-10-CM

## 2025-04-28 RX ORDER — OFLOXACIN 3 MG/ML
1 SOLUTION/ DROPS OPHTHALMIC 4 TIMES DAILY
Qty: 5 ML | Refills: 3 | Status: SHIPPED | OUTPATIENT
Start: 2025-04-28

## 2025-04-28 RX ORDER — PREDNISOLONE ACETATE 10 MG/ML
1 SUSPENSION/ DROPS OPHTHALMIC 4 TIMES DAILY
Qty: 5 ML | Refills: 3 | Status: SHIPPED | OUTPATIENT
Start: 2025-04-28

## 2025-04-29 ENCOUNTER — OFFICE VISIT (OUTPATIENT)
Dept: OPHTHALMOLOGY | Facility: CLINIC | Age: 70
End: 2025-04-29
Payer: MEDICARE

## 2025-04-29 DIAGNOSIS — T86.8412 FAILURE OF CORNEA TRANSPLANT OF LEFT EYE: Primary | ICD-10-CM

## 2025-04-29 DIAGNOSIS — H50.00 ESOTROPIA: ICD-10-CM

## 2025-04-29 PROCEDURE — G2211 COMPLEX E/M VISIT ADD ON: HCPCS | Mod: S$GLB,,, | Performed by: OPHTHALMOLOGY

## 2025-04-29 PROCEDURE — 99999 PR PBB SHADOW E&M-EST. PATIENT-LVL I: CPT | Mod: PBBFAC,,, | Performed by: OPHTHALMOLOGY

## 2025-04-29 PROCEDURE — 1159F MED LIST DOCD IN RCRD: CPT | Mod: CPTII,S$GLB,, | Performed by: OPHTHALMOLOGY

## 2025-04-29 PROCEDURE — 3288F FALL RISK ASSESSMENT DOCD: CPT | Mod: CPTII,S$GLB,, | Performed by: OPHTHALMOLOGY

## 2025-04-29 PROCEDURE — 99213 OFFICE O/P EST LOW 20 MIN: CPT | Mod: S$GLB,,, | Performed by: OPHTHALMOLOGY

## 2025-04-29 PROCEDURE — 1126F AMNT PAIN NOTED NONE PRSNT: CPT | Mod: CPTII,S$GLB,, | Performed by: OPHTHALMOLOGY

## 2025-04-29 PROCEDURE — 1101F PT FALLS ASSESS-DOCD LE1/YR: CPT | Mod: CPTII,S$GLB,, | Performed by: OPHTHALMOLOGY

## 2025-04-29 NOTE — PROGRESS NOTES
"HPI    Dr. Marrero     K edema OS   S/p PKP OD 2003 - [place]   S/p DSEK OS 2007 - "   S/p phaco OU 2007   Esotropia     Gtt's:   Durezol QID OS    Patient is here for 2 month corneal f/u of left eye.  Pt. Have not noticed any changes in her vision.  Pt. States light bothers her and see star burst around light.  Pt. Denies pain today.               Last edited by Vane Espana on 4/29/2025 10:28 AM.            Assessment /Plan     For exam results, see Encounter Report.    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 Failed DSEK OS    Instructed pt to use PF then durezol at last visit 6/24 -- pt did not comply. Lost to f/up.    Maybe due to expenses vs. Pharmacist telling her she had an allergy?    Restarted durezol -- minimal improvement    discuss repeat DSEK OS - pt motivated for surgery    Will schedule. Can decrease durezol to BID for now.                       Today's visit is associated with current and anticipated ongoing medical care related to this patient's single serious/complex condition (cornea). Follow up is to be continued indefinitely to monitor the condition.                    "

## 2025-05-23 ENCOUNTER — ANESTHESIA EVENT (OUTPATIENT)
Dept: SURGERY | Facility: HOSPITAL | Age: 70
End: 2025-05-23
Payer: MEDICARE

## 2025-05-25 NOTE — H&P
History    Chief complaint:  Painless progressive vision loss    Present Ilness/Diagnosis: corneal edema    Past Medical History: refer to chart    Family History/Social History: refer to chart    Allergies: see medcard    Current Medications: see medcard    Physical Exam    BP: Vital signs stable  General: No apparent distress  HEENT: corneal edema  Lungs: adequate respirations  Heart: + pulses  Abdomen: soft  Rectal/pelvic: deferred    Impression: corneal edema    Plan: Penetrating keratoplasty

## 2025-05-26 ENCOUNTER — HOSPITAL ENCOUNTER (OUTPATIENT)
Facility: HOSPITAL | Age: 70
Discharge: HOME | End: 2025-05-26
Attending: OPHTHALMOLOGY | Admitting: OPHTHALMOLOGY
Payer: MEDICARE

## 2025-05-26 ENCOUNTER — ANESTHESIA (OUTPATIENT)
Dept: SURGERY | Facility: HOSPITAL | Age: 70
End: 2025-05-26
Payer: MEDICARE

## 2025-05-26 VITALS
RESPIRATION RATE: 15 BRPM | HEIGHT: 64 IN | SYSTOLIC BLOOD PRESSURE: 125 MMHG | OXYGEN SATURATION: 95 % | WEIGHT: 120 LBS | HEART RATE: 67 BPM | BODY MASS INDEX: 20.49 KG/M2 | DIASTOLIC BLOOD PRESSURE: 81 MMHG | TEMPERATURE: 97 F

## 2025-05-26 DIAGNOSIS — H25.10 SENILE NUCLEAR SCLEROSIS: ICD-10-CM

## 2025-05-26 DIAGNOSIS — H18.20 CORNEAL EDEMA OF LEFT EYE: Primary | ICD-10-CM

## 2025-05-26 PROCEDURE — 71000033 HC RECOVERY, INTIAL HOUR: Mod: PO | Performed by: OPHTHALMOLOGY

## 2025-05-26 PROCEDURE — 63600175 PHARM REV CODE 636 W HCPCS: Mod: PO | Performed by: NURSE ANESTHETIST, CERTIFIED REGISTERED

## 2025-05-26 PROCEDURE — 65757 PREP CORNEAL ENDO ALLOGRAFT: CPT | Mod: ,,, | Performed by: OPHTHALMOLOGY

## 2025-05-26 PROCEDURE — 37000008 HC ANESTHESIA 1ST 15 MINUTES: Mod: PO | Performed by: OPHTHALMOLOGY

## 2025-05-26 PROCEDURE — 37000009 HC ANESTHESIA EA ADD 15 MINS: Mod: PO | Performed by: OPHTHALMOLOGY

## 2025-05-26 PROCEDURE — 63600175 PHARM REV CODE 636 W HCPCS: Mod: PO

## 2025-05-26 PROCEDURE — 63600175 PHARM REV CODE 636 W HCPCS: Mod: PO | Performed by: OPHTHALMOLOGY

## 2025-05-26 PROCEDURE — 65756 CORNEAL TRNSPL ENDOTHELIAL: CPT | Mod: LT,,, | Performed by: OPHTHALMOLOGY

## 2025-05-26 PROCEDURE — C1819 TISSUE LOCALIZATION-EXCISION: HCPCS | Mod: PO | Performed by: OPHTHALMOLOGY

## 2025-05-26 PROCEDURE — 36000707: Mod: PO | Performed by: OPHTHALMOLOGY

## 2025-05-26 PROCEDURE — 25000003 PHARM REV CODE 250: Mod: PO | Performed by: OPHTHALMOLOGY

## 2025-05-26 PROCEDURE — 71000015 HC POSTOP RECOV 1ST HR: Mod: PO | Performed by: OPHTHALMOLOGY

## 2025-05-26 PROCEDURE — 36000706: Mod: PO | Performed by: OPHTHALMOLOGY

## 2025-05-26 PROCEDURE — 25000003 PHARM REV CODE 250: Mod: PO

## 2025-05-26 RX ORDER — MOXIFLOXACIN 5 MG/ML
1 SOLUTION/ DROPS OPHTHALMIC
Status: COMPLETED | OUTPATIENT
Start: 2025-05-26 | End: 2025-05-26

## 2025-05-26 RX ORDER — SODIUM CHLORIDE 0.9 % (FLUSH) 0.9 %
3 SYRINGE (ML) INJECTION
Status: DISCONTINUED | OUTPATIENT
Start: 2025-05-26 | End: 2025-05-26 | Stop reason: HOSPADM

## 2025-05-26 RX ORDER — DEXAMETHASONE SODIUM PHOSPHATE 4 MG/ML
INJECTION, SOLUTION INTRA-ARTICULAR; INTRALESIONAL; INTRAMUSCULAR; INTRAVENOUS; SOFT TISSUE
Status: DISCONTINUED | OUTPATIENT
Start: 2025-05-26 | End: 2025-05-26 | Stop reason: HOSPADM

## 2025-05-26 RX ORDER — LIDOCAINE HYDROCHLORIDE 20 MG/ML
INJECTION, SOLUTION EPIDURAL; INFILTRATION; INTRACAUDAL; PERINEURAL
Status: DISCONTINUED | OUTPATIENT
Start: 2025-05-26 | End: 2025-05-26 | Stop reason: HOSPADM

## 2025-05-26 RX ORDER — CYCLOP/TROP/PROPA/PHEN/KET/WAT 1-1-0.1%
1 DROPS (EA) OPHTHALMIC (EYE)
Status: COMPLETED | OUTPATIENT
Start: 2025-05-26 | End: 2025-05-26

## 2025-05-26 RX ORDER — SODIUM CHLORIDE, SODIUM LACTATE, POTASSIUM CHLORIDE, CALCIUM CHLORIDE 600; 310; 30; 20 MG/100ML; MG/100ML; MG/100ML; MG/100ML
INJECTION, SOLUTION INTRAVENOUS CONTINUOUS
Status: DISCONTINUED | OUTPATIENT
Start: 2025-05-26 | End: 2025-05-26 | Stop reason: HOSPADM

## 2025-05-26 RX ORDER — LIDOCAINE HYDROCHLORIDE 10 MG/ML
INJECTION, SOLUTION EPIDURAL; INFILTRATION; INTRACAUDAL; PERINEURAL
Status: DISCONTINUED | OUTPATIENT
Start: 2025-05-26 | End: 2025-05-26 | Stop reason: HOSPADM

## 2025-05-26 RX ORDER — ACETAMINOPHEN 325 MG/1
650 TABLET ORAL EVERY 4 HOURS PRN
Status: DISCONTINUED | OUTPATIENT
Start: 2025-05-26 | End: 2025-05-26 | Stop reason: HOSPADM

## 2025-05-26 RX ORDER — TOBRAMYCIN AND DEXAMETHASONE 3; 1 MG/ML; MG/ML
SUSPENSION/ DROPS OPHTHALMIC
Status: DISCONTINUED | OUTPATIENT
Start: 2025-05-26 | End: 2025-05-26 | Stop reason: HOSPADM

## 2025-05-26 RX ORDER — PROPARACAINE HYDROCHLORIDE 5 MG/ML
1 SOLUTION/ DROPS OPHTHALMIC
Status: COMPLETED | OUTPATIENT
Start: 2025-05-26 | End: 2025-05-26

## 2025-05-26 RX ORDER — ONDANSETRON HYDROCHLORIDE 2 MG/ML
INJECTION, SOLUTION INTRAVENOUS
Status: DISCONTINUED | OUTPATIENT
Start: 2025-05-26 | End: 2025-05-26

## 2025-05-26 RX ORDER — BUPIVACAINE HYDROCHLORIDE 7.5 MG/ML
INJECTION, SOLUTION EPIDURAL; RETROBULBAR
Status: DISCONTINUED | OUTPATIENT
Start: 2025-05-26 | End: 2025-05-26 | Stop reason: HOSPADM

## 2025-05-26 RX ORDER — ATROPINE SULFATE 10 MG/ML
SOLUTION/ DROPS OPHTHALMIC
Status: DISCONTINUED | OUTPATIENT
Start: 2025-05-26 | End: 2025-05-26 | Stop reason: HOSPADM

## 2025-05-26 RX ORDER — FENTANYL CITRATE 50 UG/ML
INJECTION, SOLUTION INTRAMUSCULAR; INTRAVENOUS
Status: DISCONTINUED | OUTPATIENT
Start: 2025-05-26 | End: 2025-05-26

## 2025-05-26 RX ORDER — MIDAZOLAM HYDROCHLORIDE 1 MG/ML
INJECTION INTRAMUSCULAR; INTRAVENOUS
Status: DISCONTINUED | OUTPATIENT
Start: 2025-05-26 | End: 2025-05-26

## 2025-05-26 RX ORDER — LIDOCAINE HYDROCHLORIDE 10 MG/ML
1 INJECTION, SOLUTION EPIDURAL; INFILTRATION; INTRACAUDAL; PERINEURAL ONCE
Status: DISCONTINUED | OUTPATIENT
Start: 2025-05-26 | End: 2025-05-26 | Stop reason: HOSPADM

## 2025-05-26 RX ADMIN — Medication 1 DROP: at 12:05

## 2025-05-26 RX ADMIN — MOXIFLOXACIN 1 DROP: 5 SOLUTION/ DROPS OPHTHALMIC at 12:05

## 2025-05-26 RX ADMIN — ONDANSETRON 4 MG: 2 INJECTION, SOLUTION INTRAMUSCULAR; INTRAVENOUS at 01:05

## 2025-05-26 RX ADMIN — MIDAZOLAM HYDROCHLORIDE 2 MG: 1 INJECTION INTRAMUSCULAR; INTRAVENOUS at 01:05

## 2025-05-26 RX ADMIN — PROPARACAINE HYDROCHLORIDE 1 DROP: 5 SOLUTION/ DROPS OPHTHALMIC at 12:05

## 2025-05-26 RX ADMIN — FENTANYL CITRATE 50 MCG: 50 INJECTION, SOLUTION INTRAMUSCULAR; INTRAVENOUS at 01:05

## 2025-05-26 RX ADMIN — SODIUM CHLORIDE, POTASSIUM CHLORIDE, SODIUM LACTATE AND CALCIUM CHLORIDE: 600; 310; 30; 20 INJECTION, SOLUTION INTRAVENOUS at 12:05

## 2025-05-26 RX ADMIN — SODIUM CHLORIDE, SODIUM LACTATE, POTASSIUM CHLORIDE, AND CALCIUM CHLORIDE: .6; .31; .03; .02 INJECTION, SOLUTION INTRAVENOUS at 01:05

## 2025-05-26 NOTE — OP NOTE
DATE OF PROCEDURE: 05/26/2025    SURGEON: CHAY GERARDO MD    PREOPERATIVE DIAGNOSIS:  Corneal edema secondary to failed dsek, left eye.                                                                                                                                                  POSTOPERATIVE DIAGNOSIS:  Corneal edema secondary to failed dsek, left eye.                                                                                                                                            PROCEDURE:   1. Penetrating keratoplasty with descemets stripping endothelial keratoplasty (DSEK) of the LEFT eye. (90113)          ANESTHESIA: Retrobulbar block.  MAC    ESTIMATED BLOOD LOSS: <1CC    ASSISTANTS: none    SPECIMENS: none                                                                                                                INDICATIONS FOR PROCEDURE:  The patient presented in the clinic with decreased vision and was found to have corneal edema. Risks, benefits, and alternatives were discussed of the corneal transplant using the DSEK technique and the patient agreed to proceed with surgery.                                                                                PROCEDURE IN DETAIL:  The patient was brought back to the Operating Room and placed in a supine position, and placed under MAC anesthesia by the anesthesia team.  A retrobulbar block of 1:1 mixture of 2% lidocaine and 0.75% marcaine was injected into the retrobulbar space without complication.      The patient's left eye was prepped and draped in a sterile ophthalmic fashion.  A Yadi speculum was placed into the left eye. 2 paracentesis incisions were made and Healon was injected into the anterior chamber. A 4 mm temporal corneal incision was constructed and reverse sinsky was used to score Descemet membrane and the scraper was used as well to remove Descemet in its entirety. Remaining Healon was removed from the eye using  irrigation-aspiration. Attention was then turned to the donor cornea.  The previously cut tissue was trephinated with a 8.0 mm [place] lopez trephine.   Optisol was washed from the tissue and the tissue was set aside in optisol.                                                                                                                       Attention was then redirected to the patient's eye.  The corneal transplant was folded and inserted using the Endoserter into the anterior chamber.  The anterior chamber was formed with BSS.  The graft was positioned centrally, and the air was used to fill the anterior chamber.  10 minutes were allowed to pass to allow for tissue adherence, and then the air was replaced with BSS, leaving a small air bubble.  Atropine drops were placed into the eye.  A subconjunctival injection of Decadron and Ancef was placed into  the inferior subconjunctival space.  The eye was washed dried, taped shut, and shielded.  The patient will lay supine in the post op holding area for 1 hour, prior to going home. The patient is to follow up with me tomorrow, sooner if there are any issues.

## 2025-05-26 NOTE — TRANSFER OF CARE
"Anesthesia Transfer of Care Note    Patient: Linda Long    Procedure(s) Performed: Procedure(s) (LRB):  DESCEMET'S STRIPPING ENDOTHELIAL KERATOPLASTY (DSEK) (Left)    Patient location: PACU    Anesthesia Type: MAC    Transport from OR: Transported from OR on room air with adequate spontaneous ventilation    Post pain: adequate analgesia    Post assessment: no apparent anesthetic complications and tolerated procedure well    Post vital signs: stable    Level of consciousness: awake, alert and oriented    Nausea/Vomiting: no nausea/vomiting    Complications: none    Transfer of care protocol was followed    Last vitals: Visit Vitals  /82 (BP Location: Left arm, Patient Position: Sitting)   Pulse 67   Temp 36.6 °C (97.9 °F) (Skin)   Resp 15   Ht 5' 4" (1.626 m)   Wt 54.4 kg (120 lb)   SpO2 96%   Breastfeeding No   BMI 20.60 kg/m²     "

## 2025-05-26 NOTE — ANESTHESIA PREPROCEDURE EVALUATION
05/26/2025  Linda Long is a 69 y.o., female.      Pre-op Assessment    I have reviewed the Patient Summary Reports.    I have reviewed the NPO Status.   I have reviewed the Medications.     Review of Systems  Anesthesia Hx:  No problems with previous Anesthesia                Cardiovascular:  Cardiovascular Normal                                              Pulmonary:  Pulmonary Normal                       Neurological:      Headaches     SLE Dx of Headaches                               Physical Exam  General: Well nourished    Airway:  Mallampati: II   Mouth Opening: Normal  TM Distance: Normal  Neck ROM: Normal ROM        Anesthesia Plan  Type of Anesthesia, risks & benefits discussed:    Anesthesia Type: MAC  Intra-op Monitoring Plan: Standard ASA Monitors  Induction:  IV  Informed Consent: Informed consent signed with the Patient and all parties understand the risks and agree with anesthesia plan.  All questions answered.   ASA Score: 2    Ready For Surgery From Anesthesia Perspective.     .

## 2025-05-26 NOTE — DISCHARGE INSTRUCTIONS
Lens Transplant      NO HEAVY LIFTING or bending.    Eye shield remains on until follow up next day.    Rest.     Only sit up or stand up to eat or use the bathroom.    Do not apply makeup around the operative eye for 1 week.            You should expect:     Scratchy feeling in the eye for 1-2 days.     Curved shadow in your peripheral vision for 2-3 weeks.      If you experience severe pain or nausea, please call Dr. Hernandez or the on-call doctor at [phone].        Plan to see Dr. Hernandez tomorrow at ___________.     [facility]     [address] [facility] [place] [address]     2nd floor, Entrance # 1

## 2025-05-26 NOTE — PLAN OF CARE
.  Lens Transplant      NO HEAVY LIFTING or bending.    Eye shield remains on until follow up next day.    Rest.     Only sit up or stand up to eat or use the bathroom.    Do not apply makeup around the operative eye for 1 week.            You should expect:     Scratchy feeling in the eye for 1-2 days.     Curved shadow in your peripheral vision for 2-3 weeks.      If you experience severe pain or nausea, please call Dr. Hernandez or the on-call doctor at [phone].        Plan to see Dr. Hernandez tomorrow at ___________.     [facility]     [address] [place]. [address]     2nd floor, Entrance # 1

## 2025-05-26 NOTE — BRIEF OP NOTE
BRIEF DISCHARGE NOTE:    Reason for hospitalization -  Cornea surgery     Final Diagnosis - Visually significant corneal edema    Procedures and treatment provided - Status post corneal transplant    Disposition at the end of the case - Good.    Patient and family instructions (as appropriate) - Given to patient on discharge      Discharge: to home    The patient tolerated the procedure well and knows to follow up with me tomorrow morning in the eye clinic, sooner if needed.    Soo Hernandez MD

## 2025-05-26 NOTE — ANESTHESIA POSTPROCEDURE EVALUATION
Anesthesia Post Evaluation    Patient: Linda Long    Procedure(s) Performed: Procedure(s) (LRB):  DESCEMET'S STRIPPING ENDOTHELIAL KERATOPLASTY (DSEK) (Left)    Final Anesthesia Type: MAC      Patient location during evaluation: PACU  Patient participation: Yes- Able to Participate  Level of consciousness: awake and alert  Post-procedure vital signs: reviewed and stable  Pain management: adequate  Airway patency: patent    PONV status at discharge: No PONV  Anesthetic complications: no      Cardiovascular status: blood pressure returned to baseline  Respiratory status: unassisted  Hydration status: euvolemic  Follow-up not needed.              Vitals Value Taken Time   /81 05/26/25 15:05   Temp 36.2 °C (97.2 °F) 05/26/25 14:50   Pulse  05/26/25 15:58   Resp 15 05/26/25 15:05   SpO2 95 % 05/26/25 15:05         Event Time   Out of Recovery 14:35:00         Pain/Maggie Score: Maggie Score: 10 (5/26/2025  2:50 PM)

## 2025-05-27 ENCOUNTER — OFFICE VISIT (OUTPATIENT)
Dept: OPHTHALMOLOGY | Facility: CLINIC | Age: 70
End: 2025-05-27
Payer: MEDICARE

## 2025-05-27 DIAGNOSIS — Z94.7 STATUS POST CORNEAL TRANSPLANT: Primary | ICD-10-CM

## 2025-05-27 DIAGNOSIS — T86.8412 FAILURE OF CORNEA TRANSPLANT OF LEFT EYE: ICD-10-CM

## 2025-05-27 PROCEDURE — 1159F MED LIST DOCD IN RCRD: CPT | Mod: CPTII,S$GLB,, | Performed by: OPHTHALMOLOGY

## 2025-05-27 PROCEDURE — 99024 POSTOP FOLLOW-UP VISIT: CPT | Mod: S$GLB,,, | Performed by: OPHTHALMOLOGY

## 2025-05-27 NOTE — PROGRESS NOTES
"HPI    Dr. Marrero     S/p repeat DSEK OS 5/26/25 -   K edema OS   S/p PKP OD 2003 - [place]   S/p DSEK OS 2007 - "   S/p phaco OU 2007   Esotropia           Hx  esotropia     Fb sensation  Watery  discharge  os     Dls 04/29/25     Last edited by Soo Hernandez MD on 5/27/2025  4:04 PM.            Assessment /Plan     For exam results, see Encounter Report.    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

## 2025-06-11 ENCOUNTER — TELEPHONE (OUTPATIENT)
Dept: OPTOMETRY | Facility: CLINIC | Age: 70
End: 2025-06-11
Payer: MEDICARE

## 2025-06-11 ENCOUNTER — OFFICE VISIT (OUTPATIENT)
Dept: OPTOMETRY | Facility: CLINIC | Age: 70
End: 2025-06-11
Payer: MEDICARE

## 2025-06-11 DIAGNOSIS — Z98.890 POST-OPERATIVE STATE: Primary | ICD-10-CM

## 2025-06-11 DIAGNOSIS — Z94.7 H/O CORNEA TRANSPLANT: ICD-10-CM

## 2025-06-11 PROCEDURE — 99024 POSTOP FOLLOW-UP VISIT: CPT | Mod: S$GLB,,, | Performed by: OPTOMETRIST

## 2025-06-11 PROCEDURE — 99999 PR PBB SHADOW E&M-EST. PATIENT-LVL II: CPT | Mod: PBBFAC,,, | Performed by: OPTOMETRIST

## 2025-06-11 PROCEDURE — 1126F AMNT PAIN NOTED NONE PRSNT: CPT | Mod: CPTII,S$GLB,, | Performed by: OPTOMETRIST

## 2025-06-11 PROCEDURE — 1160F RVW MEDS BY RX/DR IN RCRD: CPT | Mod: CPTII,S$GLB,, | Performed by: OPTOMETRIST

## 2025-06-11 PROCEDURE — 1159F MED LIST DOCD IN RCRD: CPT | Mod: CPTII,S$GLB,, | Performed by: OPTOMETRIST

## 2025-06-11 NOTE — TELEPHONE ENCOUNTER
Called to schedule pt. Unable to leave message voicemail is not set up.    ----- Message from Tech Poncho sent at 6/11/2025  3:17 PM CDT -----  Good afternoon , Mrs Long needs a 1mo po scheduled w Dr Hernandez . I told her I would let yall know . Thank you

## 2025-06-11 NOTE — PROGRESS NOTES
HPI     Post-op Evaluation            Comments: S/p repeat DSEK OS 5/26/25 -          Comments    Pt states : feels like OS is seeing a little better   Not having any pain or discomfort   Done with vigamox , doing pred forte QID          Last edited by Poncho Maharaj on 6/11/2025  3:16 PM.            Assessment /Plan     For exam results, see Encounter Report.    Post-operative state    H/O cornea transplant      Doing well post op repeat dsek os, vision improved, no pain, finished with vigamox, cont Pred Forte, f/u with [facility] for one month

## 2025-08-26 ENCOUNTER — OFFICE VISIT (OUTPATIENT)
Dept: OPHTHALMOLOGY | Facility: CLINIC | Age: 70
End: 2025-08-26
Payer: MEDICARE

## 2025-08-26 ENCOUNTER — TELEPHONE (OUTPATIENT)
Dept: OPTOMETRY | Facility: CLINIC | Age: 70
End: 2025-08-26
Payer: MEDICARE

## 2025-08-26 DIAGNOSIS — T86.8412 FAILURE OF CORNEA TRANSPLANT OF LEFT EYE: ICD-10-CM

## 2025-08-26 DIAGNOSIS — Z94.7 STATUS POST CORNEAL TRANSPLANT: Primary | ICD-10-CM

## 2025-08-26 PROCEDURE — 1159F MED LIST DOCD IN RCRD: CPT | Mod: CPTII,S$GLB,, | Performed by: OPHTHALMOLOGY

## 2025-08-26 PROCEDURE — 1101F PT FALLS ASSESS-DOCD LE1/YR: CPT | Mod: CPTII,S$GLB,, | Performed by: OPHTHALMOLOGY

## 2025-08-26 PROCEDURE — 99024 POSTOP FOLLOW-UP VISIT: CPT | Mod: S$GLB,,, | Performed by: OPHTHALMOLOGY

## 2025-08-26 PROCEDURE — 3288F FALL RISK ASSESSMENT DOCD: CPT | Mod: CPTII,S$GLB,, | Performed by: OPHTHALMOLOGY

## 2025-08-26 PROCEDURE — 99999 PR PBB SHADOW E&M-EST. PATIENT-LVL II: CPT | Mod: PBBFAC,,, | Performed by: OPHTHALMOLOGY

## 2025-08-27 ENCOUNTER — PATIENT MESSAGE (OUTPATIENT)
Dept: OPTOMETRY | Facility: CLINIC | Age: 70
End: 2025-08-27
Payer: MEDICARE

## 2025-08-28 RX ORDER — PREDNISOLONE ACETATE 10 MG/ML
1 SUSPENSION/ DROPS OPHTHALMIC 3 TIMES DAILY
Qty: 5 ML | Refills: 6 | Status: SHIPPED | OUTPATIENT
Start: 2025-08-28

## 2025-09-04 ENCOUNTER — OFFICE VISIT (OUTPATIENT)
Dept: OPTOMETRY | Facility: CLINIC | Age: 70
End: 2025-09-04
Payer: MEDICARE

## 2025-09-04 DIAGNOSIS — H52.7 REFRACTIVE ERROR: ICD-10-CM

## 2025-09-04 DIAGNOSIS — H53.2 DIPLOPIA: Primary | ICD-10-CM

## 2025-09-04 DIAGNOSIS — Z94.7 H/O CORNEA TRANSPLANT: ICD-10-CM

## 2025-09-04 PROCEDURE — 99999 PR PBB SHADOW E&M-EST. PATIENT-LVL II: CPT | Mod: PBBFAC,,, | Performed by: OPTOMETRIST

## 2025-09-04 PROCEDURE — 99213 OFFICE O/P EST LOW 20 MIN: CPT | Mod: S$GLB,,, | Performed by: OPTOMETRIST

## 2025-09-04 PROCEDURE — 1159F MED LIST DOCD IN RCRD: CPT | Mod: CPTII,S$GLB,, | Performed by: OPTOMETRIST

## 2025-09-04 PROCEDURE — 1100F PTFALLS ASSESS-DOCD GE2>/YR: CPT | Mod: CPTII,S$GLB,, | Performed by: OPTOMETRIST

## 2025-09-04 PROCEDURE — 92015 DETERMINE REFRACTIVE STATE: CPT | Mod: S$GLB,,, | Performed by: OPTOMETRIST

## 2025-09-04 PROCEDURE — 3288F FALL RISK ASSESSMENT DOCD: CPT | Mod: CPTII,S$GLB,, | Performed by: OPTOMETRIST

## 2025-09-04 PROCEDURE — 1160F RVW MEDS BY RX/DR IN RCRD: CPT | Mod: CPTII,S$GLB,, | Performed by: OPTOMETRIST

## 2025-09-04 PROCEDURE — 1126F AMNT PAIN NOTED NONE PRSNT: CPT | Mod: CPTII,S$GLB,, | Performed by: OPTOMETRIST

## (undated) DEVICE — SKINMARKER & RULER REGULAR X-F

## (undated) DEVICE — SOL WATER STRL IRR 1000ML

## (undated) DEVICE — Device

## (undated) DEVICE — BETADINE OPTHALMIC SOL 5% 30ML

## (undated) DEVICE — SYR 3CC LUER LOC

## (undated) DEVICE — KNIFE ANGLED OPTH

## (undated) DEVICE — PUNCH BARRON VACUUM 8.0MM

## (undated) DEVICE — SUT 10/0 12IN 2MI-175 .5

## (undated) DEVICE — DRAPE HALF SURGICAL 40X58IN

## (undated) DEVICE — NDL RETROBULBAR .60 X 38MM

## (undated) DEVICE — GLOVE PI ULTRA TOUCH G SURGEON

## (undated) DEVICE — PACK EYE CUSTOM COVINGTON.

## (undated) DEVICE — COVER PROXIMA MAYO STAND

## (undated) DEVICE — NDL HYPO STD REG BVL 30G 0.5IN

## (undated) DEVICE — GARTER EYE ADULT

## (undated) DEVICE — SOL IRR BSS OPHTH 500ML STRL

## (undated) DEVICE — CANNULA ANTERIOR CHAMBER 30G

## (undated) DEVICE — ENDOSERTER CRNL ENDOTHM INSTR